# Patient Record
Sex: MALE | Race: BLACK OR AFRICAN AMERICAN | NOT HISPANIC OR LATINO | Employment: FULL TIME | URBAN - METROPOLITAN AREA
[De-identification: names, ages, dates, MRNs, and addresses within clinical notes are randomized per-mention and may not be internally consistent; named-entity substitution may affect disease eponyms.]

---

## 2017-05-25 ENCOUNTER — HOSPITAL ENCOUNTER (EMERGENCY)
Facility: HOSPITAL | Age: 37
Discharge: HOME | End: 2017-05-25
Attending: EMERGENCY MEDICINE
Payer: COMMERCIAL

## 2017-05-25 VITALS
BODY MASS INDEX: 29.84 KG/M2 | HEIGHT: 75 IN | WEIGHT: 240 LBS | OXYGEN SATURATION: 100 % | HEART RATE: 74 BPM | DIASTOLIC BLOOD PRESSURE: 103 MMHG | TEMPERATURE: 99 F | RESPIRATION RATE: 18 BRPM | SYSTOLIC BLOOD PRESSURE: 160 MMHG

## 2017-05-25 DIAGNOSIS — T15.92XA FOREIGN BODY, EYE, LEFT, INITIAL ENCOUNTER: Primary | ICD-10-CM

## 2017-05-25 PROCEDURE — 65205 REMOVE FOREIGN BODY FROM EYE: CPT | Mod: LT

## 2017-05-25 PROCEDURE — 25000003 PHARM REV CODE 250: Performed by: EMERGENCY MEDICINE

## 2017-05-25 PROCEDURE — 99283 EMERGENCY DEPT VISIT LOW MDM: CPT | Mod: 25

## 2017-05-25 RX ORDER — PROPARACAINE HYDROCHLORIDE 5 MG/ML
1 SOLUTION/ DROPS OPHTHALMIC
Status: COMPLETED | OUTPATIENT
Start: 2017-05-25 | End: 2017-05-25

## 2017-05-25 RX ORDER — TETRACAINE HYDROCHLORIDE 5 MG/ML
2 SOLUTION OPHTHALMIC
Status: DISCONTINUED | OUTPATIENT
Start: 2017-05-25 | End: 2017-05-25

## 2017-05-25 RX ADMIN — PROPARACAINE HYDROCHLORIDE 1 DROP: 5 SOLUTION/ DROPS OPHTHALMIC at 04:05

## 2017-05-25 NOTE — ED PROVIDER NOTES
Encounter Date: 5/25/2017       History     Chief Complaint   Patient presents with    Eye Pain     left eye pain i feel like have something in it. I have been flushing it all day and it is getting worse     Review of patient's allergies indicates:  No Known Allergies  Well-developed 36-year-old male complains of foreign body in his eye since this morning.  Denies any fevers chills nausea vomiting or diarrhea.  Healthy otherwise.  Takes a medication basis.  States was driving to work when he felt a foreign body his left eye.  Thinks it is an eyelash.          Past Medical History:   Diagnosis Date    Hypertension     Stroke      History reviewed. No pertinent surgical history.  History reviewed. No pertinent family history.  Social History   Substance Use Topics    Smoking status: Never Smoker    Smokeless tobacco: Not on file    Alcohol use Yes      Comment: socially     Review of Systems   Constitutional: Negative.    HENT: Negative.    Eyes: Negative.    Respiratory: Negative.    Cardiovascular: Negative.    Gastrointestinal: Negative.    Genitourinary: Negative.    Musculoskeletal: Negative.    Neurological: Negative.    Hematological: Negative.    All other systems reviewed and are negative.      Physical Exam     Initial Vitals [05/25/17 1454]   BP Pulse Resp Temp SpO2   (!) 164/112 (!) 18 15 98 °F (36.7 °C) 98 %     Physical Exam    Nursing note and vitals reviewed.  Constitutional: He appears well-developed and well-nourished.   HENT:   Head: Normocephalic.   Eyes: EOM are normal. Pupils are equal, round, and reactive to light.   there is a foreign body in the left upper eyelid that is black in nature.  Appears to be lodged under the upper eyelid.   Cardiovascular: Normal rate, regular rhythm and normal heart sounds.   Abdominal: Soft.   Neurological: He is alert and oriented to person, place, and time. He has normal strength.   Skin: Skin is warm.         ED Course   Foreign Body  Date/Time: 5/25/2017  4:33 PM  Performed by: EASTON RODRIGUEZ  Authorized by: EASTON RODRIGUEZ   Consent Done: Not Needed  Body area: eye  Anesthesia: local infiltration    Anesthesia:  Anesthesia: local infiltration  Local Anesthetic: proparacaine drops   Patient sedated: no  Patient restrained: no  Localization method: eyelid eversion  Removal mechanism: moist cotton swab  Eye not examined with fluorescein.  1 objects recovered.  Post-procedure assessment: foreign body removed  Patient tolerance: Patient tolerated the procedure well with no immediate complications      Labs Reviewed - No data to display                            ED Course     Clinical Impression:   The encounter diagnosis was Foreign body, eye, left, initial encounter.    Disposition:   Disposition: Discharged  Condition: Fair       Easton Rodriguez MD  05/25/17 3398